# Patient Record
Sex: MALE | URBAN - METROPOLITAN AREA
[De-identification: names, ages, dates, MRNs, and addresses within clinical notes are randomized per-mention and may not be internally consistent; named-entity substitution may affect disease eponyms.]

---

## 2023-08-24 ENCOUNTER — APPOINTMENT (RX ONLY)
Dept: URBAN - METROPOLITAN AREA CLINIC 151 | Facility: CLINIC | Age: 73
Setting detail: DERMATOLOGY
End: 2023-08-24

## 2023-08-24 DIAGNOSIS — Z85.828 PERSONAL HISTORY OF OTHER MALIGNANT NEOPLASM OF SKIN: ICD-10-CM

## 2023-08-24 DIAGNOSIS — D22 MELANOCYTIC NEVI: ICD-10-CM

## 2023-08-24 DIAGNOSIS — M67.4 GANGLION: ICD-10-CM

## 2023-08-24 PROBLEM — D22.5 MELANOCYTIC NEVI OF TRUNK: Status: ACTIVE | Noted: 2023-08-24

## 2023-08-24 PROBLEM — M67.462 GANGLION, LEFT KNEE: Status: ACTIVE | Noted: 2023-08-24

## 2023-08-24 PROCEDURE — ? DIAGNOSIS COMMENT

## 2023-08-24 PROCEDURE — ? COUNSELING

## 2023-08-24 PROCEDURE — 99203 OFFICE O/P NEW LOW 30 MIN: CPT

## 2023-08-24 ASSESSMENT — LOCATION DETAILED DESCRIPTION DERM
LOCATION DETAILED: LEFT KNEE
LOCATION DETAILED: RIGHT INFERIOR TEMPLE
LOCATION DETAILED: RIGHT SUPERIOR MEDIAL UPPER BACK

## 2023-08-24 ASSESSMENT — LOCATION SIMPLE DESCRIPTION DERM
LOCATION SIMPLE: RIGHT TEMPLE
LOCATION SIMPLE: RIGHT UPPER BACK
LOCATION SIMPLE: LEFT KNEE

## 2023-08-24 ASSESSMENT — LOCATION ZONE DERM
LOCATION ZONE: FACE
LOCATION ZONE: LEG
LOCATION ZONE: TRUNK

## 2023-08-24 NOTE — HPI: OTHER
Condition:: Spot check
Please Describe Your Condition:: Pt presents w a concern regarding a spot located on L knee that appeared 6 months. pt denies itch, past physical injury or pain. Pt reports minimal improvement w use of OTC first aid products and no SE. Pt also reports a concern regarding a spot located on back that has been present for yrs; pt denies itch or pain.

## 2023-08-24 NOTE — PROCEDURE: MIPS QUALITY
Quality 226: Preventive Care And Screening: Tobacco Use: Screening And Cessation Intervention: Tobacco Screening not Performed
Quality 402: Tobacco Use And Help With Quitting Among Adolescents: Patient screened for tobacco and is an ex-smoker
Detail Level: Detailed
Quality 431: Preventive Care And Screening: Unhealthy Alcohol Use - Screening: Patient not identified as an unhealthy alcohol user when screened for unhealthy alcohol use using a systematic screening method
Quality 110: Preventive Care And Screening: Influenza Immunization: Influenza Immunization Administered during Influenza season

## 2023-08-24 NOTE — PROCEDURE: DIAGNOSIS COMMENT
Comment: Spot of pt concern per HPI. Present for many years. Reassured pt on benign nature. No tx necessary.
Detail Level: Detailed
Render Risk Assessment In Note?: no
Comment: Including spot of concern; onset 6 months ago. Pt denies hx of physical trauma, pain and itching. Pt reports that spot of concern sometimes drains blood/fluid. Advised pt to consult an orthopedic specialist.
Comment: Surgically removed from R temple 4 yrs ago, pt unsure of type of skin cancer.